# Patient Record
Sex: FEMALE | Race: OTHER | NOT HISPANIC OR LATINO | URBAN - METROPOLITAN AREA
[De-identification: names, ages, dates, MRNs, and addresses within clinical notes are randomized per-mention and may not be internally consistent; named-entity substitution may affect disease eponyms.]

---

## 2020-03-01 ENCOUNTER — EMERGENCY (EMERGENCY)
Facility: HOSPITAL | Age: 65
LOS: 1 days | Discharge: HOME | End: 2020-03-01
Attending: EMERGENCY MEDICINE
Payer: MEDICARE

## 2020-03-01 VITALS
DIASTOLIC BLOOD PRESSURE: 79 MMHG | HEART RATE: 73 BPM | RESPIRATION RATE: 18 BRPM | OXYGEN SATURATION: 95 % | SYSTOLIC BLOOD PRESSURE: 130 MMHG

## 2020-03-01 VITALS
DIASTOLIC BLOOD PRESSURE: 84 MMHG | HEIGHT: 63 IN | SYSTOLIC BLOOD PRESSURE: 160 MMHG | TEMPERATURE: 99 F | HEART RATE: 92 BPM | WEIGHT: 139.99 LBS | OXYGEN SATURATION: 99 % | RESPIRATION RATE: 18 BRPM

## 2020-03-01 PROCEDURE — 99284 EMERGENCY DEPT VISIT MOD MDM: CPT

## 2020-03-01 RX ORDER — OXYCODONE HYDROCHLORIDE 5 MG/1
5 TABLET ORAL ONCE
Refills: 0 | Status: DISCONTINUED | OUTPATIENT
Start: 2020-03-01 | End: 2020-03-01

## 2020-03-01 RX ORDER — IBUPROFEN 200 MG
600 TABLET ORAL ONCE
Refills: 0 | Status: COMPLETED | OUTPATIENT
Start: 2020-03-01 | End: 2020-03-01

## 2020-03-01 RX ORDER — LIDOCAINE 4 G/100G
1 CREAM TOPICAL ONCE
Refills: 0 | Status: COMPLETED | OUTPATIENT
Start: 2020-03-01 | End: 2020-03-01

## 2020-03-01 RX ADMIN — LIDOCAINE 1 PATCH: 4 CREAM TOPICAL at 22:16

## 2020-03-01 RX ADMIN — OXYCODONE HYDROCHLORIDE 5 MILLIGRAM(S): 5 TABLET ORAL at 22:16

## 2020-03-01 RX ADMIN — Medication 600 MILLIGRAM(S): at 22:19

## 2020-03-01 NOTE — ED ADULT NURSE NOTE - OBJECTIVE STATEMENT
63 yo F aaox4 c/o of back pain, and left sided hip pain. Denies any trauma or injury. Denies CP dizziness weakness or sob. No NVD. Provider at bedside evaluating.

## 2020-03-01 NOTE — ED PROVIDER NOTE - CLINICAL SUMMARY MEDICAL DECISION MAKING FREE TEXT BOX
64 year-old female with history of HTN, HLD, DM presents to the Emergency Department for non-traumatic back pain; left sided likely sciatica.  Plan for pain medications and reassess.

## 2020-03-01 NOTE — ED ADULT NURSE NOTE - CHPI ED NUR SYMPTOMS NEG
no numbness/no tingling/no constipation/no bowel dysfunction/no difficulty bearing weight/no neck tenderness/no fatigue/no motor function loss/no anorexia/no bladder dysfunction

## 2020-03-01 NOTE — ED PROVIDER NOTE - PATIENT PORTAL LINK FT
You can access the FollowMyHealth Patient Portal offered by [facility] by registering at the following website: [email or web address]. By joining [facility]’s FollowMyHealth portal, you will also be able to view your health information using other applications (apps) compatible with our system.

## 2020-03-01 NOTE — ED PROVIDER NOTE - NSFOLLOWUPINSTRUCTIONS_ED_ALL_ED_FT
Follow-up with your Primary Care Physician within 24-48 hours.  Please return to the Emergency Department immediately for any new, worsening or concerning symptoms.    Can use Tylenol (up to 1000mg every 6 hours) and/or Ibuprofen (up to 400mg every 6 hours) as per package directions for pain - use only as needed.  These are over-the-counter medications - please respect the warnings on the label.     Lidocaine patch, available over the counter, can also be used per package directions.    Follow up with the [facility] by calling [phone]. You were evaluated in the Emergency Department for back pain; you were examined by a physician and were diagnosed with low back pain / sprain.  There were no signs of any emergency medical conditions during today's evaluation.  A presumptive diagnosis was made, but further evaluation may be required by your primary care doctor or specialist for a definitive diagnosis.  Therefore, follow up as directed.  If symptoms change or worsen please return to the ER.     Follow-up with your Primary Care Physician within 24-48 hours.  Please return to the Emergency Department immediately for any new, worsening or concerning symptoms.    Can use Tylenol (up to 1000mg every 6 hours) and/or Ibuprofen (up to 400mg every 6 hours) as per package directions for pain - use only as needed.  These are over-the-counter medications - please respect the warnings on the label.     Lidocaine patch, available over the counter, can also be used per package directions.    You may take Oxycodone (1 tablet up to every 6 hours) as needed for breakthrough pain.  Do not drive while taking this medication (it may make you fall asleep).    Follow up with the [facility] by calling [phone].

## 2020-03-01 NOTE — ED PROVIDER NOTE - OBJECTIVE STATEMENT
64 year-old female with history of HTN, HLD, DM presents to the Emergency Department for non-traumatic back pain; left sided.  Started yesterday left sided with radiation into the left leg with paresthesia.  No weakness/numbness in LE.  No urinary/fecal incontinence or retention.  Tried Tylenol at home w/o relief. 64 year-old female with history of HTN, HLD, DM presents to the Emergency Department for non-traumatic back pain; left sided.  Started yesterday left sided with radiation into the left leg with paresthesia.  No weakness/numbness in LE.  No urinary/fecal incontinence or retention.  No saddle anesthesia.  Tried Tylenol at home w/o relief.

## 2020-03-01 NOTE — ED PROVIDER NOTE - ATTENDING CONTRIBUTION TO CARE
Attending Statement (AKIL Kwong MD):    HPI: 64F with a h/o HTN, HLD, DM, presenting with atraumatic left back pain; indicates starts in lower back above buttock and radiates down outside of left leg; burning sensation a/w pain, but no loss of ability to feel, no loss of control of bowel or bladder function.  No fever/chills, denies fall/mvc/trauma.  No AC use.    A 14-point ROS was performed with pertinent information as noted above, with the following additions:  -general: no fever/chills.  -cardiac: no chest pain  -pulm: no SOB  -GI: no abdominal pain, no nausea/vomiting/diarrhea  -: no loss of urinary continence or difficulty urinating  -Neuro: no weakness  -Skin: no rash  -Endo: h/o DM    PSH/PMH as noted above     On Physical Exam:  General: well appearing, in NAD, speaking clearly in full sentences and without difficulty; cooperative with exam  HEENT: PERRL, MMM  Neck: no neck tenderness, no nuchal rigidity  Cardiac: normal s1, s2; RRR; no MGR  Lungs: CTABL  Abdomen: soft nontender/nondistended  : no bladder tenderness or distension  Skin: intact, no rash  Back: no midline thoracic or lumbar spine tenderness or deformity  Extremities: no peripheral edema, no gross deformities  Neuro: no gross neurologic deficits;  no saddle anesthesia, intact patellar and ankle reflexes b/l; sensation intact in b/l LE to light touch.  Able to ambulate unassisted but with pain (steady gait+)    AP: atraumatic low back pain, likely lumbar radiculopathy / sciatica; will treat with pain medication; no signs or symptoms to suggest acute cord compression or cauda equina; no fever or immune suppression medications to suggest infectious etiology.  If improving with pain medication, will be stable for dc with continued evaluation/management as outpatient.      ED Course: patient improved with medications; observed ambulating in ED w/o assistance; is stable for dc with outpatient f/u through spine center; d/w patient/family return precautions for signs of new weakness, loss of bowel/bladder control, fever, or other new/concerning symptoms.

## 2020-03-01 NOTE — ED PROVIDER NOTE - PHYSICAL EXAMINATION
*Gen: NAD, AAO*3  *HEENT: NC/AT, MMM, airway patent, trachea midline  *CV: RRR, S1/S2 present  *Resp: no respiratory distress, LCTAB  *Abd: non-distended, soft N/Tx4, no guarding or rigidity  *Neuro: no focal neuro deficits, moving all limbs appropriately  *Back: no midline spinal TTP, left sided para-lumbar pain  *Extremities: no gross deformity  *Skin: no rashes, no wounds   ~ Yariel Cuba M.D.

## 2020-03-02 RX ORDER — OXYCODONE HYDROCHLORIDE 5 MG/1
1 TABLET ORAL
Qty: 12 | Refills: 0
Start: 2020-03-02 | End: 2020-03-04
